# Patient Record
Sex: FEMALE | Race: WHITE | NOT HISPANIC OR LATINO | Employment: UNEMPLOYED | URBAN - NONMETROPOLITAN AREA
[De-identification: names, ages, dates, MRNs, and addresses within clinical notes are randomized per-mention and may not be internally consistent; named-entity substitution may affect disease eponyms.]

---

## 2019-01-03 ENCOUNTER — OFFICE VISIT (OUTPATIENT)
Dept: FAMILY MEDICINE CLINIC | Facility: CLINIC | Age: 34
End: 2019-01-03

## 2019-01-03 VITALS
WEIGHT: 156.4 LBS | TEMPERATURE: 97.8 F | BODY MASS INDEX: 31.53 KG/M2 | RESPIRATION RATE: 16 BRPM | DIASTOLIC BLOOD PRESSURE: 62 MMHG | SYSTOLIC BLOOD PRESSURE: 104 MMHG | HEART RATE: 102 BPM | OXYGEN SATURATION: 99 % | HEIGHT: 59 IN

## 2019-01-03 DIAGNOSIS — B96.89 ACUTE BACTERIAL SINUSITIS: Primary | ICD-10-CM

## 2019-01-03 DIAGNOSIS — J01.90 ACUTE BACTERIAL SINUSITIS: Primary | ICD-10-CM

## 2019-01-03 PROCEDURE — 99203 OFFICE O/P NEW LOW 30 MIN: CPT | Performed by: FAMILY MEDICINE

## 2019-01-03 RX ORDER — ONDANSETRON 4 MG/1
4 TABLET, FILM COATED ORAL EVERY 8 HOURS PRN
Qty: 12 TABLET | Refills: 1 | Status: SHIPPED | OUTPATIENT
Start: 2019-01-03 | End: 2019-10-11

## 2019-01-03 RX ORDER — AMOXICILLIN 500 MG/1
1000 CAPSULE ORAL 3 TIMES DAILY
Qty: 60 CAPSULE | Refills: 0 | Status: SHIPPED | OUTPATIENT
Start: 2019-01-03 | End: 2019-01-13

## 2019-01-03 NOTE — PATIENT INSTRUCTIONS
Sinus Rinse  What is a sinus rinse?  A sinus rinse is a home treatment. It rinses your sinuses with a mixture of salt and water (saline solution). Sinuses are air-filled spaces in your skull behind the bones of your face and forehead. They open into your nasal cavity.  To do a sinus rinse, you will need:  · Saline solution.  · Neti pot or spray bottle. This releases the saline solution into your nose and through your sinuses. You can buy neti pots and spray bottles at:  ? Your local pharmacy.  ? A [employer] food store.  ? Online.    When should I do a sinus rinse?  A sinus rinse can help to clear your nasal cavity. It can clear:  · Mucus.  · Dirt.  · Dust.  · Pollen.    You may do a sinus rinse when you have:  · A cold.  · A virus.  · Allergies.  · A sinus infection.  · A stuffy nose.    If you are considering a sinus rinse:  · Ask your child's doctor before doing a sinus rinse on your child.  · Do not do a sinus rinse if you have had:  ? Ear or nasal surgery.  ? An ear infection.  ? Blocked ears.    How do I do a sinus rinse?  · Wash your hands.  · Disinfect your device using the directions that came with the device.  · Dry your device.  · Use the solution that comes with your device or one that is sold separately in stores. Follow the mixing directions on the package.  · Fill your device with the amount of saline solution as stated in the device instructions.  · Stand over a sink and tilt your head sideways over the sink.  · Place the spout of the device in your upper nostril (the one closer to the ceiling).  · Gently pour or squeeze the saline solution into the nasal cavity. The liquid should drain to the lower nostril if you are not too congested.  · Gently blow your nose. Blowing too hard may cause ear pain.  · Repeat in the other nostril.  · Clean and rinse your device with clean water.  · Air-dry your device.  Are there risks of a sinus rinse?  Sinus rinse is normally very safe and helpful. However, there are a  few risks, which include:  · A burning feeling in the sinuses. This may happen if you do not make the saline solution as instructed. Make sure to follow all directions when making the saline solution.  · Infection from unclean water. This is rare, but possible.  · Nasal irritation.    This information is not intended to replace advice given to you by your health care provider. Make sure you discuss any questions you have with your health care provider.  Document Released: 07/15/2015 Document Revised: 11/14/2017 Document Reviewed: 05/05/2015  Elsevier Interactive Patient Education © 2017 [employer].

## 2019-01-03 NOTE — PROGRESS NOTES
Subjective   Leslie Mckeon is a 33 y.o. female. Presents today to establish care to be evaluated for possible sinusitis x 1 week.      History of Present Illness     Sinus Pain  Patient complains of facial pain, foul breath, frequent clearing of the throat, headaches, nasal congestion, post nasal drip and nausea from postnasal drip. Onset of symptoms was 10 days ago. Symptoms have been unchanged since that time. She is drinking plenty of fluids.  Past history is significant for nothing. Patient is non-smoker.  Patient is using Mucinex for sinus pain as well as Tylenol.  This is helping some.  She brought in a picture of brownish-green phlegm that came out of her nose.    The following portions of the patient's history were reviewed and updated as appropriate: allergies, current medications, past family history, past medical history, past social history, past surgical history and problem list.    Review of Systems   HENT: Positive for congestion, sinus pressure and sinus pain.    Respiratory: Positive for cough.        Objective   Physical Exam   Constitutional: She appears well-developed and well-nourished. No distress.   HENT:   Right Ear: Hearing, tympanic membrane, external ear and ear canal normal.   Left Ear: Hearing, tympanic membrane, external ear and ear canal normal.   Nose: Right sinus exhibits maxillary sinus tenderness and frontal sinus tenderness. Left sinus exhibits maxillary sinus tenderness and frontal sinus tenderness.   Mouth/Throat: Uvula is midline and mucous membranes are normal.   cobblestoning   Eyes: Conjunctivae are normal. Right eye exhibits no discharge. Left eye exhibits no discharge.   Neck: Neck supple.   Cardiovascular: Normal rate, regular rhythm and normal heart sounds. Exam reveals no gallop and no friction rub.   No murmur heard.  Pulmonary/Chest: Effort normal and breath sounds normal. She has no wheezes. She has no rales.   Abdominal: Soft. Bowel sounds are normal.    Lymphadenopathy:     She has no cervical adenopathy.   Skin: Skin is warm. Capillary refill takes less than 2 seconds. No rash noted. She is not diaphoretic.   Nursing note and vitals reviewed.      Assessment/Plan   Leslie was seen today for establish care and sinusitis.    Diagnoses and all orders for this visit:    Acute bacterial sinusitis  -     amoxicillin (AMOXIL) 500 MG capsule; Take 2 capsules by mouth 3 (Three) Times a Day for 10 days.  -     ondansetron (ZOFRAN) 4 MG tablet; Take 1 tablet by mouth Every 8 (Eight) Hours As Needed for Nausea or Vomiting.      Sent in amoxil and Zofran for the infection and nausea, respectively.

## 2019-01-09 ENCOUNTER — TELEPHONE (OUTPATIENT)
Dept: FAMILY MEDICINE CLINIC | Facility: CLINIC | Age: 34
End: 2019-01-09

## 2019-01-09 NOTE — TELEPHONE ENCOUNTER
The amoxicillin will kill the bacteria but it is does not drain the sinuses.  You need sinus rinse, flonase, and mucinex to do that.  Benadryl will make that worse and more difficult to drain. Work on draining the sinuses for the next week.

## 2019-01-09 NOTE — TELEPHONE ENCOUNTER
----- Message from Haroon Milian sent at 1/9/2019 12:10 PM EST -----  Regarding: REQUESTING A CALL  PATIENT IS REQUESTING A CALL. PLEASE CALL [phone]    Called the patient states she was seen last Thursday for a sinus infection and she has taken amoxicillin for a week and she isn't any better, still has green and yellow sinus drainage, sinus pressure, sore throat, sneezing and the Benadryl isn't helping, wants to know if there is anything else she can try?

## 2019-10-11 ENCOUNTER — OFFICE VISIT (OUTPATIENT)
Dept: FAMILY MEDICINE CLINIC | Facility: CLINIC | Age: 34
End: 2019-10-11

## 2019-10-11 VITALS
DIASTOLIC BLOOD PRESSURE: 84 MMHG | RESPIRATION RATE: 16 BRPM | OXYGEN SATURATION: 99 % | TEMPERATURE: 98.3 F | WEIGHT: 151.2 LBS | HEIGHT: 59 IN | HEART RATE: 103 BPM | SYSTOLIC BLOOD PRESSURE: 122 MMHG | BODY MASS INDEX: 30.48 KG/M2

## 2019-10-11 DIAGNOSIS — F43.10 PTSD (POST-TRAUMATIC STRESS DISORDER): Primary | ICD-10-CM

## 2019-10-11 PROCEDURE — 99214 OFFICE O/P EST MOD 30 MIN: CPT | Performed by: FAMILY MEDICINE

## 2019-10-11 RX ORDER — DULOXETIN HYDROCHLORIDE 30 MG/1
CAPSULE, DELAYED RELEASE ORAL DAILY
Refills: 2 | COMMUNITY
Start: 2019-10-09 | End: 2020-02-28

## 2019-10-11 RX ORDER — PROPRANOLOL HYDROCHLORIDE 10 MG/1
TABLET ORAL EVERY 8 HOURS SCHEDULED
COMMUNITY
End: 2020-02-28

## 2019-10-11 RX ORDER — VALACYCLOVIR HYDROCHLORIDE 1 G/1
TABLET, FILM COATED ORAL DAILY
Refills: 3 | COMMUNITY
Start: 2019-10-09
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
## 2019-10-11 NOTE — PATIENT INSTRUCTIONS
Living With Post-Traumatic Stress Disorder  If you have been diagnosed with post-traumatic stress disorder (PTSD), you may be relieved that you now know why you have felt or behaved a certain way. Still, you may feel overwhelmed about the treatment ahead. You may also wonder how to get the support you need and how to deal with the condition day-to-day.  If you are living with PTSD, there are ways to help you recover from it and manage your symptoms.  How to manage lifestyle changes  Managing stress  Stress is your body's reaction to life changes and events, both good and bad. Stress can make PTSD worse. Take the following steps to cope with stress:  · Talk with your health care provider or a counselor if you would like to learn more about techniques to reduce your stress. He or she may suggest some stress reduction techniques such as:  ? Muscle relaxation exercises.  ? Regular exercise.  ? Meditation, yoga, or other mind-body exercises.  ? Breathing exercises.  ? Listening to quiet music.  ? Spending time outside.  · Maintain a healthy lifestyle. Eat a healthy diet, exercise regularly, get plenty of sleep, and take time to relax.  · Spend time with others. Talk with them about how you are feeling and what kind of support you need. Try to not isolate yourself, even though you may feel like doing that. Isolating yourself can delay your recovery.  · Do activities and hobbies that you enjoy.  · Pace yourself when doing stressful things. Take breaks, and reward yourself when you finish. Make sure that you do not overload your schedule.    Medicines  Your health care provider may suggest certain medicines if he or she feels that they will help to improve your condition. Antidepressants or antipsychotic medicines may be used to treat PTSD. Avoid using alcohol and other substances that may prevent your medicines from working properly (may interact). It is also important to:  · Talk with your pharmacist or health care  provider about all medicines that you take, their possible side effects, and which medicines are safe to take together.  · Make it your goal to take part in all treatment decisions (shared decision-making). Ask about possible side effects of medicines that your health care provider recommends, and tell him or her how you feel about having those side effects. It is best if shared decision-making with your health care provider is part of your total treatment plan.  If your health care provider prescribes a medicine, you may not notice the full benefits of it for 4-8 weeks. Most people who are treated for PTSD need to take medicine for at least 6-12 months after they feel better. If you are taking medicines as part of your treatment, do not stop taking medicines before you ask your health care provider if it is safe to stop. You may need to have the medicine slowly decreased (tapered) over time to lower the risk of harmful side effects.  Relationships  Many people who have PTSD have difficulty trusting others. Make an effort to:  · Take risks and develop trust with close friends and family members. Developing trust in others can help you feel safe and connect you with emotional support.  · Be open and honest about your feelings.  · Try to have fun and relax in safe spaces, such as with friends and family.  · Think about going to couples counseling, family education classes, or family therapy. Your loved ones may not always know how to be supportive. Therapy can be helpful for everyone.  How to recognize changes in your condition  Be aware of your symptoms and how often you have them. The following symptoms mean that you need to seek help for your PTSD:  · You feel suspicious and angry.  · You have repeated flashbacks.  · You avoid going out or being with others.  · You have an increasing number of fights with close friends or family members, such as your spouse.  · You have thoughts about hurting yourself or  others.  · You cannot get relief from feelings of depression or anxiety.  Where to find support  Talking to others  · Explain that PTSD is a mental health problem. It is something that a person can develop after experiencing or seeing a life-threatening event. Tell them that PTSD makes you feel stress like you did during the event.  · Talk to your loved ones about the symptoms you have. Also tell them what things or situations can cause symptoms to start (are triggers for you).  · Assure your loved ones that there are treatments to help PTSD. Discuss possibly seeking family therapy or couples therapy.  · If you are worried or fearful about seeking treatment, ask for support.  Finances  Not all insurance plans cover mental health care, so it is important to check with your insurance carrier. If paying for co-pays or counseling services is a problem, search for a local or [facility] mental health care center. Public mental health care services may be offered there at a low cost or no cost when you are not able to see a private health care provider. If you are a , contact a local veterans organization or [facility] for more information.  If you are taking medicine for PTSD, you may be able to get the genericform, which may be less expensive than brand-name medicine. Some makers of prescription medicines also offer help to patients who cannot afford the medicines that they need.  Community resources  · Find a support group in your community. Often, groups are available for  veterans, trauma victims, and family members or caregivers.  · Look into volunteer opportunities. Taking part in these can help you feel more connected to your community.  · Contact a local organization to find out if you are eligible for a service dog.  · Keep daily contact with at least one trusted friend or family member.  Follow these instructions at home:  Lifestyle  · Exercise regularly. Try to do 30 or more minutes of  physical activity on most days of the week.  · Try to get 7-9 hours of sleep each night. To help with sleep:  ? Keep your bedroom cool and dark.  ? Do not eat a heavy meal during the hour before you go to bed.  ? Do not drink alcohol or caffeinated drinks before bed.  ? Avoid screen time before bedtime. This means avoiding use of your TV, computer, tablet, and cell phone.  · Avoid using alcohol or drugs.  · Practice self-soothing skills and use them daily.  · Try to have fun and seek humor in your life.  General instructions  · If your PTSD is affecting your marriage or family, seek help from a family therapist.  · Take over-the-counter and prescription medicines only as told by your health care provider.  · Make sure to let all of your health care providers know that you have PTSD. This is especially important if you are having surgery or need to be admitted to the hospital.  · Keep all follow-up visits as told by your health care providers. This is important.  Where to find more information  Go to this website to find more information about PTSD, treatment for PTSD, and how to get support:  · National Center for PTSD: www.ptsd.va.gov  Contact a health care provider if:  · Your symptoms get worse or they do not get better.  Get help right away if:  · You have thoughts about hurting yourself or others.  If you ever feel like you may hurt yourself or others, or have thoughts about taking your own life, get help right away. You can go to your nearest emergency department or call:  · Your local emergency services (911 in the [place]  · A suicide crisis helpline, such as the National Suicide Prevention Lifeline at 1-489.428.3707. This is open 24-hours a day.  Summary  · If you are living with PTSD, there are ways to help you recover from it and manage your symptoms.  · Find supportive environments and people who understand PTSD. Spend time in those places, and maintain contact with those people.  · Work with your health  care team to create a management plan that includes counseling, stress management techniques, and healthy lifestyle habits.  This information is not intended to replace advice given to you by your health care provider. Make sure you discuss any questions you have with your health care provider.  Document Released: 04/19/2018 Document Revised: 04/19/2018 Document Reviewed: 04/19/2018  [employer] Interactive Patient Education © 2019 [employer] Inc.

## 2020-02-21 ENCOUNTER — TELEPHONE (OUTPATIENT)
Dept: FAMILY MEDICINE CLINIC | Facility: CLINIC | Age: 35
End: 2020-02-21

## 2020-02-28 ENCOUNTER — OFFICE VISIT (OUTPATIENT)
Dept: FAMILY MEDICINE CLINIC | Facility: CLINIC | Age: 35
End: 2020-02-28

## 2020-02-28 VITALS
TEMPERATURE: 98.6 F | BODY MASS INDEX: 31.81 KG/M2 | OXYGEN SATURATION: 98 % | WEIGHT: 157.8 LBS | DIASTOLIC BLOOD PRESSURE: 68 MMHG | SYSTOLIC BLOOD PRESSURE: 108 MMHG | HEART RATE: 97 BPM | HEIGHT: 59 IN | RESPIRATION RATE: 16 BRPM

## 2020-02-28 DIAGNOSIS — F43.10 PTSD (POST-TRAUMATIC STRESS DISORDER): Primary | ICD-10-CM

## 2020-02-28 PROCEDURE — 99214 OFFICE O/P EST MOD 30 MIN: CPT | Performed by: FAMILY MEDICINE

## 2020-02-28 RX ORDER — LEVOTHYROXINE AND LIOTHYRONINE 76; 18 UG/1; UG/1
TABLET ORAL DAILY
COMMUNITY

## 2020-02-28 RX ORDER — DULOXETIN HYDROCHLORIDE 30 MG/1
30 CAPSULE, DELAYED RELEASE ORAL 2 TIMES DAILY
Qty: 60 CAPSULE | Refills: 5 | Status: SHIPPED | OUTPATIENT
Start: 2020-02-28

## 2020-02-28 RX ORDER — BUSPIRONE HYDROCHLORIDE 7.5 MG/1
TABLET ORAL 4 TIMES DAILY
COMMUNITY
Start: 2020-02-17

## 2020-02-28 RX ORDER — FERROUS SULFATE 325(65) MG
TABLET ORAL DAILY
COMMUNITY

## 2020-02-28 NOTE — PROGRESS NOTES
Subjective   Leslie Mckeon is a 34 y.o. female. Presents today to discuss ongoing PTSD.     History of Present Illness     PTSD-the daily symptoms appear stable but occasionally she is having panic attacks which have been a problem for her.  She says she can deal with them better than she did in her past because of her training with psychotherapy.  She is currently taking Cymbalta 30mg and BuSpar.  She has stopped taking her propranolol as she is realize that it does not work very well for her problem.  She has multiple times that she can have something to take when she is in this state and she has no history of taking anything other than the propranolol.  She has not been able to get into [facility] psychiatry because of a long wait time and low staffing.  She is interested in seeing a psychiatrist/psychologist.    The following portions of the patient's history were reviewed and updated as appropriate: allergies, current medications, past family history, past medical history, past social history, past surgical history and problem list.    Review of Systems   Psychiatric/Behavioral: Positive for dysphoric mood and sleep disturbance. Negative for agitation, behavioral problems, confusion, decreased concentration, hallucinations, self-injury and suicidal ideas. The patient is nervous/anxious. The patient is not hyperactive.    All other systems reviewed and are negative.      Objective   Physical Exam   Constitutional: She appears well-developed and well-nourished. No distress.   Cardiovascular: Normal rate, regular rhythm and normal heart sounds. Exam reveals no gallop and no friction rub.   No murmur heard.  Pulmonary/Chest: Effort normal and breath sounds normal. She has no wheezes. She has no rales.   Skin: Skin is warm. Capillary refill takes less than 2 seconds. She is not diaphoretic.   Psychiatric: Her speech is normal and behavior is normal. Judgment and thought content normal. Her mood  appears anxious. Cognition and memory are normal. She is attentive.   Nursing note and vitals reviewed.      Assessment/Plan   Leslie was seen today for med management.    Diagnoses and all orders for this visit:    PTSD (post-traumatic stress disorder)  -     DULoxetine (CYMBALTA) 30 MG capsule; Take 1 capsule by mouth 2 (Two) Times a Day.    I am going to increase her Cymbalta to 60 mg/day and continue with the BuSpar.  I explained to her that trying to take something during a panic attack will not work usually and will only give sedation and possible addictive effects.  Also do not like the idea because psychiatry is getting away from that old thought and I do not want her to have to come off of benzos if she goes and sees a psychiatrist.  I explained it would be better to maximize our prophylactic medication then try to add a as needed medication.  I gave her the numbers for [facility] and asked her to call them and will follow up with her in 3 months if she has not gotten established with mental health.

## 2020-02-28 NOTE — PATIENT INSTRUCTIONS
Living With Post-Traumatic Stress Disorder  If you have been diagnosed with post-traumatic stress disorder (PTSD), you may be relieved that you now know why you have felt or behaved a certain way. Still, you may feel overwhelmed about the treatment ahead. You may also wonder how to get the support you need and how to deal with the condition day-to-day.  If you are living with PTSD, there are ways to help you recover from it and manage your symptoms.  How to manage lifestyle changes  Managing stress  Stress is your body's reaction to life changes and events, both good and bad. Stress can make PTSD worse. Take the following steps to cope with stress:  · Talk with your health care provider or a counselor if you would like to learn more about techniques to reduce your stress. He or she may suggest some stress reduction techniques such as:  ? Muscle relaxation exercises.  ? Regular exercise.  ? Meditation, yoga, or other mind-body exercises.  ? Breathing exercises.  ? Listening to quiet music.  ? Spending time outside.  · Maintain a healthy lifestyle. Eat a healthy diet, exercise regularly, get plenty of sleep, and take time to relax.  · Spend time with others. Talk with them about how you are feeling and what kind of support you need. Try to not isolate yourself, even though you may feel like doing that. Isolating yourself can delay your recovery.  · Do activities and hobbies that you enjoy.  · Pace yourself when doing stressful things. Take breaks, and reward yourself when you finish. Make sure that you do not overload your schedule.    Medicines  Your health care provider may suggest certain medicines if he or she feels that they will help to improve your condition. Antidepressants or antipsychotic medicines may be used to treat PTSD. Avoid using alcohol and other substances that may prevent your medicines from working properly (may interact). It is also important to:  · Talk with your pharmacist or health care  provider about all medicines that you take, their possible side effects, and which medicines are safe to take together.  · Make it your goal to take part in all treatment decisions (shared decision-making). Ask about possible side effects of medicines that your health care provider recommends, and tell him or her how you feel about having those side effects. It is best if shared decision-making with your health care provider is part of your total treatment plan.  If your health care provider prescribes a medicine, you may not notice the full benefits of it for 4-8 weeks. Most people who are treated for PTSD need to take medicine for at least 6-12 months after they feel better. If you are taking medicines as part of your treatment, do not stop taking medicines before you ask your health care provider if it is safe to stop. You may need to have the medicine slowly decreased (tapered) over time to lower the risk of harmful side effects.  Relationships  Many people who have PTSD have difficulty trusting others. Make an effort to:  · Take risks and develop trust with close friends and family members. Developing trust in others can help you feel safe and connect you with emotional support.  · Be open and honest about your feelings.  · Try to have fun and relax in safe spaces, such as with friends and family.  · Think about going to couples counseling, family education classes, or family therapy. Your loved ones may not always know how to be supportive. Therapy can be helpful for everyone.  How to recognize changes in your condition  Be aware of your symptoms and how often you have them. The following symptoms mean that you need to seek help for your PTSD:  · You feel suspicious and angry.  · You have repeated flashbacks.  · You avoid going out or being with others.  · You have an increasing number of fights with close friends or family members, such as your spouse.  · You have thoughts about hurting yourself or  others.  · You cannot get relief from feelings of depression or anxiety.  Where to find support  Talking to others  · Explain that PTSD is a mental health problem. It is something that a person can develop after experiencing or seeing a life-threatening event. Tell them that PTSD makes you feel stress like you did during the event.  · Talk to your loved ones about the symptoms you have. Also tell them what things or situations can cause symptoms to start (are triggers for you).  · Assure your loved ones that there are treatments to help PTSD. Discuss possibly seeking family therapy or couples therapy.  · If you are worried or fearful about seeking treatment, ask for support.  Finances  Not all insurance plans cover mental health care, so it is important to check with your insurance carrier. If paying for co-pays or counseling services is a problem, search for a local or [facility] mental health care center. Public mental health care services may be offered there at a low cost or no cost when you are not able to see a private health care provider. If you are a , contact a local veterans organization or [facility] for more information.  If you are taking medicine for PTSD, you may be able to get the genericform, which may be less expensive than brand-name medicine. Some makers of prescription medicines also offer help to patients who cannot afford the medicines that they need.  Community resources  · Find a support group in your community. Often, groups are available for  veterans, trauma victims, and family members or caregivers.  · Look into volunteer opportunities. Taking part in these can help you feel more connected to your community.  · Contact a local organization to find out if you are eligible for a service dog.  · Keep daily contact with at least one trusted friend or family member.  Follow these instructions at home:  Lifestyle  · Exercise regularly. Try to do 30 or more minutes of  physical activity on most days of the week.  · Try to get 7-9 hours of sleep each night. To help with sleep:  ? Keep your bedroom cool and dark.  ? Do not eat a heavy meal during the hour before you go to bed.  ? Do not drink alcohol or caffeinated drinks before bed.  ? Avoid screen time before bedtime. This means avoiding use of your TV, computer, tablet, and cell phone.  · Avoid using alcohol or drugs.  · Practice self-soothing skills and use them daily.  · Try to have fun and seek humor in your life.  General instructions  · If your PTSD is affecting your marriage or family, seek help from a family therapist.  · Take over-the-counter and prescription medicines only as told by your health care provider.  · Make sure to let all of your health care providers know that you have PTSD. This is especially important if you are having surgery or need to be admitted to the hospital.  · Keep all follow-up visits as told by your health care providers. This is important.  Where to find more information  Go to this website to find more information about PTSD, treatment for PTSD, and how to get support:  · National Center for PTSD: www.ptsd.va.gov  Contact a health care provider if:  · Your symptoms get worse or they do not get better.  Get help right away if:  · You have thoughts about hurting yourself or others.  If you ever feel like you may hurt yourself or others, or have thoughts about taking your own life, get help right away. You can go to your nearest emergency department or call:  · Your local emergency services (911 in the [place]  · A suicide crisis helpline, such as the National Suicide Prevention Lifeline at 1-904.287.8311. This is open 24-hours a day.  Summary  · If you are living with PTSD, there are ways to help you recover from it and manage your symptoms.  · Find supportive environments and people who understand PTSD. Spend time in those places, and maintain contact with those people.  · Work with your health  care team to create a management plan that includes counseling, stress management techniques, and healthy lifestyle habits.  This information is not intended to replace advice given to you by your health care provider. Make sure you discuss any questions you have with your health care provider.  Document Released: 04/19/2018 Document Revised: 12/30/2019 Document Reviewed: 04/19/2018  [employer] Patient Education © 2020 [employer]

## 2020-03-24 DIAGNOSIS — J01.90 ACUTE BACTERIAL SINUSITIS: ICD-10-CM

## 2020-03-24 DIAGNOSIS — B96.89 ACUTE BACTERIAL SINUSITIS: ICD-10-CM

## 2020-03-24 NOTE — TELEPHONE ENCOUNTER
Since this is not one of her regular meds, we should get some documentation in the chart as to why she needs it.  Is she having a GI bug or something?

## 2020-03-26 RX ORDER — ONDANSETRON 4 MG/1
TABLET, FILM COATED ORAL
Qty: 12 TABLET | Refills: 1 | OUTPATIENT
Start: 2020-03-26

## 2021-04-16 ENCOUNTER — BULK ORDERING (OUTPATIENT)
Dept: CASE MANAGEMENT | Facility: OTHER | Age: 36
End: 2021-04-16

## 2021-04-16 DIAGNOSIS — Z23 IMMUNIZATION DUE: ICD-10-CM
